# Patient Record
Sex: MALE | HISPANIC OR LATINO
[De-identification: names, ages, dates, MRNs, and addresses within clinical notes are randomized per-mention and may not be internally consistent; named-entity substitution may affect disease eponyms.]

---

## 2021-02-01 DIAGNOSIS — Z01.818 ENCOUNTER FOR OTHER PREPROCEDURAL EXAMINATION: ICD-10-CM

## 2021-02-01 PROBLEM — Z00.00 ENCOUNTER FOR PREVENTIVE HEALTH EXAMINATION: Status: ACTIVE | Noted: 2021-02-01

## 2021-02-02 ENCOUNTER — APPOINTMENT (OUTPATIENT)
Dept: DISASTER EMERGENCY | Facility: CLINIC | Age: 36
End: 2021-02-02

## 2021-02-03 LAB — SARS-COV-2 N GENE NPH QL NAA+PROBE: NOT DETECTED

## 2023-10-09 ENCOUNTER — NON-APPOINTMENT (OUTPATIENT)
Age: 38
End: 2023-10-09

## 2023-10-09 ENCOUNTER — APPOINTMENT (OUTPATIENT)
Dept: NEUROLOGY | Facility: CLINIC | Age: 38
End: 2023-10-09
Payer: COMMERCIAL

## 2023-10-09 VITALS
HEIGHT: 67 IN | DIASTOLIC BLOOD PRESSURE: 74 MMHG | HEART RATE: 83 BPM | SYSTOLIC BLOOD PRESSURE: 107 MMHG | WEIGHT: 192 LBS | BODY MASS INDEX: 30.13 KG/M2

## 2023-10-09 DIAGNOSIS — Z78.9 OTHER SPECIFIED HEALTH STATUS: ICD-10-CM

## 2023-10-09 PROCEDURE — 99204 OFFICE O/P NEW MOD 45 MIN: CPT

## 2023-11-15 ENCOUNTER — OUTPATIENT (OUTPATIENT)
Dept: OUTPATIENT SERVICES | Facility: HOSPITAL | Age: 38
LOS: 1 days | End: 2023-11-15
Payer: COMMERCIAL

## 2023-11-15 ENCOUNTER — APPOINTMENT (OUTPATIENT)
Dept: MRI IMAGING | Facility: CLINIC | Age: 38
End: 2023-11-15
Payer: COMMERCIAL

## 2023-11-15 ENCOUNTER — RESULT REVIEW (OUTPATIENT)
Age: 38
End: 2023-11-15

## 2023-11-15 DIAGNOSIS — G35 MULTIPLE SCLEROSIS: ICD-10-CM

## 2023-11-15 PROCEDURE — 70553 MRI BRAIN STEM W/O & W/DYE: CPT | Mod: 26

## 2023-11-15 PROCEDURE — 72157 MRI CHEST SPINE W/O & W/DYE: CPT

## 2023-11-15 PROCEDURE — 72157 MRI CHEST SPINE W/O & W/DYE: CPT | Mod: 26

## 2023-11-15 PROCEDURE — 72156 MRI NECK SPINE W/O & W/DYE: CPT | Mod: 26

## 2023-11-15 PROCEDURE — 72156 MRI NECK SPINE W/O & W/DYE: CPT

## 2023-11-15 PROCEDURE — 70553 MRI BRAIN STEM W/O & W/DYE: CPT

## 2023-11-15 PROCEDURE — A9585: CPT

## 2024-02-12 ENCOUNTER — APPOINTMENT (OUTPATIENT)
Dept: NEUROLOGY | Facility: CLINIC | Age: 39
End: 2024-02-12
Payer: COMMERCIAL

## 2024-02-12 VITALS
DIASTOLIC BLOOD PRESSURE: 78 MMHG | HEIGHT: 67 IN | BODY MASS INDEX: 29.51 KG/M2 | HEART RATE: 60 BPM | WEIGHT: 188 LBS | SYSTOLIC BLOOD PRESSURE: 113 MMHG

## 2024-02-12 DIAGNOSIS — G35 MULTIPLE SCLEROSIS: ICD-10-CM

## 2024-02-12 PROCEDURE — 99214 OFFICE O/P EST MOD 30 MIN: CPT

## 2024-02-12 NOTE — DATA REVIEWED
[de-identified] : MR brain, C and T spine w/w/o 11/15/2023- Brain MRI stable c/w 2020 (mild PV lesion, left criss, R midbrain and L PLIC). C and T spine stable c/w 2021 (Lesion:  ventral C2, left C4), no T cord lesions.

## 2024-02-12 NOTE — ASSESSMENT
[FreeTextEntry1] : Assessment/Plan:  38 year old male w/ hx of relapsing multiple sclerosis (dx'd 2016, first attack at 2011- ON), on ocrevus since 2019. Clinically and radiologically stable.  Plan: 1. Diagnostic Plan/Imaging: Repeat MRI brain and C w/o contrast for radiological stability 11/2024.   2. Disease Modifying therapy plan: Continue Ocrelizumab 600 mg q6 monthly infusion - scheduled 2/29.  Ocrelizumab labs every 6 months (CBC with diff, LFT, BUN, Creatinine)  3. Symptomatic therapy plan: () Vitamin D3 and B12 supplementations  Return to clinic 6 months  The above plan was discussed with TRAY ANG in great detail. TRAY SAV verbalized understanding and agrees with plan as detailed above. Patient was provided education and counselling on current diagnosis/symptoms. He was advised to call our clinic at [phone] for any new or worsening symptoms, or with any questions or concerns. TRAY SAV expressed understanding and all his questions/concerns were addressed.  Adrienne Hanna M.D.

## 2024-02-12 NOTE — HISTORY OF PRESENT ILLNESS
[FreeTextEntry1] : INTERIM HX 02/12/2024: MR brain, C and T spine w/w/o 11/15/2023- Brain MRI stable c/w 2020 (mild PV lesion, left criss, R midbrain and L PLIC). C and T spine stable c/w 2021 (Lesion:  ventral C2, left C4), no T cord lesions.  He is stable, no new symptoms. Occasional numbness in R hand. He is a .  Has had more sinus infections since COVID (late 2020), roughly 3 a year- will monitor.   HPI (initial visit Oct 09, 2023)- TRAY ANG is a 38 year old man referred for MS. His neurologist, Dr Gab Petit retired. Here to establish care.   [place] 2011/[place], [place] (), he had blurry vision and pain with eye movements, diagnosed optic neuritis (not sure which eye) . He was given oral prednisone taper. Vision loss resolved. ' Never followed with neurologist after returning to [place].  In 2016, entire right sided felt numb and weak. Felt dizzy, was losing his balance. Admitted to [facility].  Had MRI and spinal tap, dx'd with MS. Treated with steroids.  Started seeing Dr Petit.  Started on Tecfidera, switched to Ocrevus in 2019 after relapse (R optic neuritis, treated steroids)-0 vision improved.    Last ocrevus infusion, 8/28/2023.  He has been getting yearly MRI's @ [facility]. Last MRI was done > 1 year ago, ~ 5/2022. stable MRI since ocrevus  Day 2 day symptoms: "I am okay", occasional tingling in RLE in the morning, brief.   Maternal grandfather had AD. Dad has HTN. No autoimmune disorders in family.

## 2024-02-12 NOTE — PHYSICAL EXAM
[FreeTextEntry1] : PHYSICAL EXAM Constitutional: Alert, no acute distress Psychiatric: appropriate affect and mood Pulmonary: No respiratory distress, stable on room air  NEUROLOGICAL EXAM Mental status: The patient is alert, attentive and conversational memory intact. Speech/language: No dysarthria Cranial nerves: CN II: Visual fields are full to confrontation. Pupil size equal and briskly reactive to light. No RAPD. No red color desaturation. VA   20/30 OD 20/25 OS CN III, IV, VI: EOMI, no nystagmus, no ptosis CN V: Facial sensation is intact to pinprick in all 3 divisions bilaterally. CN VII: Face is symmetric with normal eye closure and smile. CN VII: Hearing is normal to rubbing fingers CN IX, X: Palate elevates symmetrically. CN XI: Head turning and shoulder shrug are intact CN XII: Tongue is midline with normal movements and no atrophy. Motor: Strength is full bilaterally. 5/5 muscle power in bilateral UE and LE. Reflexes: R L  Biceps 2+ 2+  Patellar 3+ 3+  Achilles 2+ 2+ no gilliam or clous Plantar responses- R down, L down Sensory: LT/PP sensation intact UE and LE Coordination/Cerebellar: There is no dysmetria on finger-to-nose and heel to shin. Gait/Stance: Posture is normal. Gait is steady with normal steps, base, arm swing, and turning. Tandem gait is normal. Romberg is negative.

## 2024-02-23 RX ORDER — OCRELIZUMAB 300 MG/10ML
300 INJECTION INTRAVENOUS
Qty: 2 | Refills: 1 | Status: ACTIVE | COMMUNITY
Start: 1900-01-01 | End: 1900-01-01

## 2024-02-27 LAB
25(OH)D3 SERPL-MCNC: 38.1 NG/ML
ALBUMIN SERPL ELPH-MCNC: 4.5 G/DL
ALP BLD-CCNC: 79 U/L
ALT SERPL-CCNC: 19 U/L
ANION GAP SERPL CALC-SCNC: 10 MMOL/L
AST SERPL-CCNC: 19 U/L
BASOPHILS # BLD AUTO: 0.05 K/UL
BASOPHILS NFR BLD AUTO: 0.9 %
BILIRUB SERPL-MCNC: 0.6 MG/DL
BUN SERPL-MCNC: 15 MG/DL
CALCIUM SERPL-MCNC: 9.4 MG/DL
CHLORIDE SERPL-SCNC: 104 MMOL/L
CO2 SERPL-SCNC: 26 MMOL/L
CREAT SERPL-MCNC: 1.1 MG/DL
EGFR: 89 ML/MIN/1.73M2
EOSINOPHIL # BLD AUTO: 0.07 K/UL
EOSINOPHIL NFR BLD AUTO: 1.3 %
GLUCOSE SERPL-MCNC: 104 MG/DL
HCT VFR BLD CALC: 42.7 %
HGB BLD-MCNC: 14.1 G/DL
IMM GRANULOCYTES NFR BLD AUTO: 0.6 %
LYMPHOCYTES # BLD AUTO: 1.08 K/UL
LYMPHOCYTES NFR BLD AUTO: 20.3 %
MAN DIFF?: NORMAL
MCHC RBC-ENTMCNC: 30.1 PG
MCHC RBC-ENTMCNC: 33 GM/DL
MCV RBC AUTO: 91 FL
MONOCYTES # BLD AUTO: 0.41 K/UL
MONOCYTES NFR BLD AUTO: 7.7 %
NEUTROPHILS # BLD AUTO: 3.69 K/UL
NEUTROPHILS NFR BLD AUTO: 69.2 %
PLATELET # BLD AUTO: 276 K/UL
POTASSIUM SERPL-SCNC: 4.4 MMOL/L
PROT SERPL-MCNC: 7.1 G/DL
RBC # BLD: 4.69 M/UL
RBC # FLD: 14.5 %
SODIUM SERPL-SCNC: 140 MMOL/L
WBC # FLD AUTO: 5.33 K/UL

## 2024-02-28 LAB
DEPRECATED KAPPA LC FREE/LAMBDA SER: 1.68 RATIO
HBV CORE IGG+IGM SER QL: NONREACTIVE
HBV CORE IGM SER QL: NONREACTIVE
HBV SURFACE AB SER QL: REACTIVE
HBV SURFACE AG SER QL: NONREACTIVE
IGA SER QL IEP: 150 MG/DL
IGG SER QL IEP: 1571 MG/DL
IGM SER QL IEP: 49 MG/DL
KAPPA LC CSF-MCNC: 1.4 MG/DL
KAPPA LC SERPL-MCNC: 2.35 MG/DL
VZV AB TITR SER: POSITIVE
VZV IGG SER IF-ACNC: 1320 INDEX
VZV IGM SER IF-ACNC: <0.91 INDEX

## 2024-02-29 ENCOUNTER — APPOINTMENT (OUTPATIENT)
Dept: NEUROLOGY | Facility: CLINIC | Age: 39
End: 2024-02-29

## 2024-02-29 LAB
M TB IFN-G BLD-IMP: NEGATIVE
QUANTIFERON TB PLUS MITOGEN MINUS NIL: >10 IU/ML
QUANTIFERON TB PLUS NIL: 0.02 IU/ML
QUANTIFERON TB PLUS TB1 MINUS NIL: 0 IU/ML
QUANTIFERON TB PLUS TB2 MINUS NIL: 0 IU/ML

## 2024-03-04 LAB
JCV INDEX: 0.7
STRATIFY JCV ANTIBODY: POSITIVE

## 2024-07-19 ENCOUNTER — APPOINTMENT (OUTPATIENT)
Dept: NEUROLOGY | Facility: CLINIC | Age: 39
End: 2024-07-19
Payer: COMMERCIAL

## 2024-07-19 DIAGNOSIS — G35 MULTIPLE SCLEROSIS: ICD-10-CM

## 2024-07-19 PROCEDURE — 99214 OFFICE O/P EST MOD 30 MIN: CPT

## 2024-07-19 PROCEDURE — G2211 COMPLEX E/M VISIT ADD ON: CPT | Mod: NC

## 2024-09-03 ENCOUNTER — APPOINTMENT (OUTPATIENT)
Dept: MRI IMAGING | Facility: CLINIC | Age: 39
End: 2024-09-03
Payer: COMMERCIAL

## 2024-09-03 ENCOUNTER — RESULT REVIEW (OUTPATIENT)
Age: 39
End: 2024-09-03

## 2024-09-03 ENCOUNTER — OUTPATIENT (OUTPATIENT)
Dept: OUTPATIENT SERVICES | Facility: HOSPITAL | Age: 39
LOS: 1 days | End: 2024-09-03
Payer: COMMERCIAL

## 2024-09-03 DIAGNOSIS — G35 MULTIPLE SCLEROSIS: ICD-10-CM

## 2024-09-03 PROCEDURE — 70551 MRI BRAIN STEM W/O DYE: CPT | Mod: 26

## 2024-09-03 PROCEDURE — 76377 3D RENDER W/INTRP POSTPROCES: CPT

## 2024-09-03 PROCEDURE — 72141 MRI NECK SPINE W/O DYE: CPT | Mod: 26

## 2024-09-03 PROCEDURE — 70551 MRI BRAIN STEM W/O DYE: CPT

## 2024-09-03 PROCEDURE — 76377 3D RENDER W/INTRP POSTPROCES: CPT | Mod: 26

## 2024-09-03 PROCEDURE — 72141 MRI NECK SPINE W/O DYE: CPT

## 2025-04-01 ENCOUNTER — APPOINTMENT (OUTPATIENT)
Dept: NEUROLOGY | Facility: CLINIC | Age: 40
End: 2025-04-01
Payer: COMMERCIAL

## 2025-04-01 VITALS
SYSTOLIC BLOOD PRESSURE: 114 MMHG | DIASTOLIC BLOOD PRESSURE: 74 MMHG | HEIGHT: 67 IN | HEART RATE: 60 BPM | BODY MASS INDEX: 29.82 KG/M2 | WEIGHT: 190 LBS

## 2025-04-01 DIAGNOSIS — G35 MULTIPLE SCLEROSIS: ICD-10-CM

## 2025-04-01 PROCEDURE — 99214 OFFICE O/P EST MOD 30 MIN: CPT

## 2025-04-01 PROCEDURE — G2211 COMPLEX E/M VISIT ADD ON: CPT | Mod: NC
